# Patient Record
(demographics unavailable — no encounter records)

---

## 2024-10-16 NOTE — ASSESSMENT
[FreeTextEntry1] : This young lady comes today for the chief complaint of juvenile-onset idiopathic scoliosis.   INTERVAL HISTORY:  Katarzyna comes today accompanied by her mother and father.  She has been doing very well.  She does not report any significant back pain or discomfort.  Her mother and father report that she has been active and has had no limitations.  Her mother and father feel that her posture has improved somewhat and they do not feel that she has had any clinical decompensation.  She is here today for xrays to r/o progression and see if bracing will be started.  The patient did ultimately undergo an MRI scan of the spine, which did not reveal any evidence of internal spinal pathology or issues at the brainstem, which were leading to any type of aggressive scoliosis pattern.  The patient comes today accompanied by her mother and father regarding further surveillance for the above.   Since the day of the last evaluation, there has been no significant change in past medical or social history.   Review of systems today is negative for fever, chills, chest pain, shortness of breath, or rashes.   PHYSICAL EXAMINATION: On physical examination today, Katarzyna is in no apparent distress.  She is pleasant and cooperative.  Focused examination of Bryan's forward bending test demonstrates a right thoracolumbar paraspinal prominence measuring approximately 7-8 degrees with a compensatory left lumbar prominence of 6-7 degrees.  There is deviation of the spinous processes, which appears to be somewhat worse than her prior exam with no pain with forward flexion or hyperextension.  Supine examination reveals 5/5 motor strength of the lower extremities.  Patellar and Achilles reflexes are 2+ and symmetric with no evidence of asymmetry of the popliteal angles bilaterally, which appear to be 15 degrees from vertical.   X-ray imaging was variable today, which were obtained in the office, AP and lateral standing scoliosis x-rays indicating increase in magnitude of the right apex thoracic curve now to approximately 22  degrees with a compensatory lumbar curve with appropriate sagittal and coronal balance.  Patient still appears to be skeletally immature based on her standard score.   ASSESSMENT/PLAN: Katarzyna is an 8-year-old little girl who has a diagnosis of juvenile-onset idiopathic scoliosis, which appears to be stable today. Her clinical exam has improved since last visit and xrays show no progression. The history for today's visit was obtained from the child, as well as the parent. The child's history was unreliable alone due to age and therefore, the parent was used today as an independent historian. AP and lateral standing scoliosis x-rays indicating increase in magnitude of the right apex thoracic curve now to approximately 22  degrees with a compensatory lumbar curve with appropriate sagittal and coronal balance.  Patient still appears to be skeletally immature based on her standard score. Continued close observation is recommended at this time. She will f/u in 4 months for clinical exam and repeat xrays of the spine at that time. If there is concern for progression, TLSO bracing will be considered. Scoliosis discussed with parents at length. Activity as tolerated All questions answered. Parent in agreement with the plan. IMary, MPAS, PAC, have acted as a scribe and documented the above for Dr. Box.  The above documentation completed by the scribe is an accurate record of both my words and actions.  JPD

## 2025-03-13 NOTE — ASSESSMENT
[FreeTextEntry1] : The patient comes today for the chief complaint of juvenile-onset idiopathic scoliosis.  Today's visit lasted for approximately 30 minutes with time spent discussing progression of the curve with need for bracing.  This is time independent of education, teaching and other reported services.   INTERVAL HISTORY:  Katarzyna comes today for further followup.  She was last evaluated back in October regarding a curve, which was bordering on treatment.  The patient's premenarchal had a magnitude of curvature approximately 22 degrees.  The patient's mother and father do not report that they have seen any significant progression of her curve.  Of note, based on the young age on presentation, an MRI scan was performed back in 2023, which did not indicate any evidence of intraspinal pathology.  Now, the family comes today for a clinical reassessment and likely repeat radiographs.   Since the day of the last evaluation, there has been no significant change in past medical or social history.   Review of systems today is negative for fever, chills, chest pain, shortness of breath, or rashes.   PHYSICAL EXAMINATION: On physical examination today, Katarzyna is in no apparent distress. She is pleasant and cooperative.  On Bryan's forward bend, the patient does demonstrate some increase in ATR measurements with approximately 9 to 10 degrees ATR with a significant deviation of the spinous processes, 9 degrees left thoracolumbar paraspinal prominence, 11 degrees up in the right thoracic region.  Supine examination reveals no evidence of leg length inequality.  Normal range of motion about the ankles, knees and hips with motor strength 5/5 throughout.  The patient has symmetric popliteal angles measuring at 30 degrees with abdominal reflexes equal in all quadrants.   MRI imaging was available for review from 2023 indicating no evidence of intraspinal pathology.  Today's x-rays are somewhat surprising given the increase in magnitude of the curvature of the patient's Risser stage 0.  She has approximately 35 to 36-degree curve.  Since imaging back in October, this has increased from 22 degrees indicating a significant progression.   ASSESSMENT/PLAN: Katarzyna is a 9-year-old young lady who has a diagnosis of juvenile-onset idiopathic scoliosis.  She is premenarchal and has had a significant jump in her curve by approximately 13 to 14 degrees.  This is unusual given the fact that there is no intraspinal pathology that had been noted on MRI scan in the past.  As such, I made recommendations for aggressive treatment with bracing as soon as possible to prevent further progression.  I discussed the scoliosis TLSO brace with minimal brace wear between 13 and 14 hours a day in order to achieve success and trying to prevent progression of curve.  I also reviewed the fact that approximately one-third of patients also do not benefit from bracing when they have aggressive curves with surgical indications beginning at 45 degrees with more generally accepted numbers at 50 degrees.  Today, Jn from [facility] was available for evaluation to fit her and begin the process of TLSO bracing.  I have made recommendations for followup approximately one to two weeks after initiating bracing for x-rays in the brace to evaluate for the corrective effect to ensure that it is an effective brace and helps to maximize our chances of avoiding surgery.  All questions were answered to satisfaction today.  Katarzyna's mother and father expressed understanding and agree. Today, they acted as independent historians given the child's pediatric age.

## 2025-03-13 NOTE — ASSESSMENT
[FreeTextEntry1] : The patient comes today for the chief complaint of juvenile-onset idiopathic scoliosis.  Today's visit lasted for approximately 30 minutes with time spent discussing progression of the curve with need for bracing.  This is time independent of education, teaching and other reported services.   INTERVAL HISTORY:  Katarzyna comes today for further followup.  She was last evaluated back in October regarding a curve, which was bordering on treatment.  The patient's premenarchal had a magnitude of curvature approximately 22 degrees.  The patient's mother and father do not report that they have seen any significant progression of her curve.  Of note, based on the young age on presentation, an MRI scan was performed back in 2023, which did not indicate any evidence of intraspinal pathology.  Now, the family comes today for a clinical reassessment and likely repeat radiographs.   Since the day of the last evaluation, there has been no significant change in past medical or social history.   Review of systems today is negative for fever, chills, chest pain, shortness of breath, or rashes.   PHYSICAL EXAMINATION: On physical examination today, Katarzyna is in no apparent distress. She is pleasant and cooperative.  On Bryan's forward bend, the patient does demonstrate some increase in ATR measurements with approximately 9 to 10 degrees ATR with a significant deviation of the spinous processes, 9 degrees left thoracolumbar paraspinal prominence, 11 degrees up in the right thoracic region.  Supine examination reveals no evidence of leg length inequality.  Normal range of motion about the ankles, knees and hips with motor strength 5/5 throughout.  The patient has symmetric popliteal angles measuring at 30 degrees with abdominal reflexes equal in all quadrants.   MRI imaging was available for review from 2023 indicating no evidence of intraspinal pathology.  Today's x-rays are somewhat surprising given the increase in magnitude of the curvature of the patient's Risser stage 0.  She has approximately 35 to 36-degree curve.  Since imaging back in October, this has increased from 22 degrees indicating a significant progression.   ASSESSMENT/PLAN: Katarzyna is a 9-year-old young lady who has a diagnosis of juvenile-onset idiopathic scoliosis.  She is premenarchal and has had a significant jump in her curve by approximately 13 to 14 degrees.  This is unusual given the fact that there is no intraspinal pathology that had been noted on MRI scan in the past.  As such, I made recommendations for aggressive treatment with bracing as soon as possible to prevent further progression.  I discussed the scoliosis TLSO brace with minimal brace wear between 13 and 14 hours a day in order to achieve success and trying to prevent progression of curve.  I also reviewed the fact that approximately one-third of patients also do not benefit from bracing when they have aggressive curves with surgical indications beginning at 45 degrees with more generally accepted numbers at 50 degrees.  Today, Jn from [facility] was available for evaluation to fit her and begin the process of TLSO bracing.  I have made recommendations for followup approximately one to two weeks after initiating bracing for x-rays in the brace to evaluate for the corrective effect to ensure that it is an effective brace and helps to maximize our chances of avoiding surgery.  All questions were answered to satisfaction today.  Katarzyna's mother and father expressed understanding and agree. Today, they acted as independent historians given the child's pediatric age.

## 2025-05-27 NOTE — ASSESSMENT
[FreeTextEntry1] : This young lady comes today accompanied by her mother and father regarding the diagnosis of juvenile-onset idiopathic scoliosis, currently being managed in full-time TLSO bracing.   INTERVAL HISTORY:  Katarzyna comes today accompanied by her parents.  She has been doing well.  Her parents report that she has been wearing it approximately 14 hours a day.  There have been no issues with skin irritation.  The family comes today for a regularly scheduled followup to assess for the correction in the brace.  Katarzyna denies any complaints of pain, radicular symptoms, or paresthesias.  She does not feel that the brace is causing any significant irritation.   Since the day of the last evaluation, there has been no significant change in past medical or social history.   Review of systems today is negative for fevers, chills, chest pain, shortness of breath, or rashes.   PHYSICAL EXAMINATION: On physical examination today, Katarzyna is in no apparent distress.  She is pleasant and cooperative.  She appears to be wearing the brace well.  The patient does not have any evidence of skin irritation grossly.  A minimal physical examination was performed as a routine part of today's visit given the fact that this is solely for the assessment of brace correction of the curve.   On x-ray evaluation, AP scoliosis x-ray in the brace indicates evidence of correction of the lumbar curve, which is satisfactory.  The patient has had some correction of the upper curve of approximately 7 degrees, although there still is an appreciable thoracic curve.  It appears as though the pads are somewhat low and missing the rib to the apical vertebral body.   ASSESSMENT/PLAN: Katarzyna is a 9-year-old female who has the diagnosis of juvenile-onset idiopathic scoliosis.  Today, I reviewed the x-ray imaging with the family indicating the fact that the brace is functional.  It is correcting the lumbar curve; however, it has only had a minimal effect, less than 30%, on the thoracic curve.  I reviewed the x-ray imaging with Roni wells [facility] who is available to assess the fit of the brace and does feel that the brace is somewhat sagging down.  I have made recommendations for adjustment of the brace to improve the correction of the thoracic curve closer to a 30% correction to maximize effect.  I would continue to encourage Katarzyna to wear the current brace as it does have an effect, particularly on the lumbar curve, and is diminishing the magnitude of the thoracic curve.  I have made recommendations for changes to the brace.  If significant modifications are made, I would like Katarzyna to return with x-rays in the brace.  If there is sufficient evidence of correction, at that point, we would stage her followup to x-ray imaging in approximately six months with x-rays out of the brace.  An order will be placed in order to help facilitate x-ray imaging without a needed followup visit.  All questions were answered to satisfaction today.  Both Katarzyna's mother and father, who act as independent historians today given the child's pediatric age, expressed understanding and agree.